# Patient Record
Sex: FEMALE | Race: WHITE | NOT HISPANIC OR LATINO | Employment: OTHER | URBAN - METROPOLITAN AREA
[De-identification: names, ages, dates, MRNs, and addresses within clinical notes are randomized per-mention and may not be internally consistent; named-entity substitution may affect disease eponyms.]

---

## 2023-12-12 ENCOUNTER — OFFICE VISIT (OUTPATIENT)
Dept: HEMATOLOGY/ONCOLOGY | Facility: HOSPITAL | Age: 78
End: 2023-12-12
Payer: MEDICARE

## 2023-12-12 VITALS
BODY MASS INDEX: 32.16 KG/M2 | OXYGEN SATURATION: 100 % | RESPIRATION RATE: 18 BRPM | SYSTOLIC BLOOD PRESSURE: 122 MMHG | DIASTOLIC BLOOD PRESSURE: 70 MMHG | WEIGHT: 153.22 LBS | TEMPERATURE: 97.7 F | HEART RATE: 79 BPM | HEIGHT: 58 IN

## 2023-12-12 DIAGNOSIS — C50.011 MALIGNANT NEOPLASM OF AREOLA OF RIGHT BREAST IN FEMALE, ESTROGEN RECEPTOR POSITIVE (MULTI): Primary | ICD-10-CM

## 2023-12-12 DIAGNOSIS — Z17.0 MALIGNANT NEOPLASM OF AREOLA OF RIGHT BREAST IN FEMALE, ESTROGEN RECEPTOR POSITIVE (MULTI): Primary | ICD-10-CM

## 2023-12-12 PROCEDURE — 99215 OFFICE O/P EST HI 40 MIN: CPT | Performed by: NURSE PRACTITIONER

## 2023-12-12 PROCEDURE — 1126F AMNT PAIN NOTED NONE PRSNT: CPT | Performed by: NURSE PRACTITIONER

## 2023-12-12 PROCEDURE — 1159F MED LIST DOCD IN RCRD: CPT | Performed by: NURSE PRACTITIONER

## 2023-12-12 RX ORDER — FLUTICASONE PROPIONATE AND SALMETEROL XINAFOATE 45; 21 UG/1; UG/1
2 AEROSOL, METERED RESPIRATORY (INHALATION)
COMMUNITY

## 2023-12-12 RX ORDER — LANOLIN ALCOHOL/MO/W.PET/CERES
100 CREAM (GRAM) TOPICAL DAILY
COMMUNITY

## 2023-12-12 RX ORDER — ASCORBIC ACID 250 MG
250 TABLET ORAL DAILY
COMMUNITY

## 2023-12-12 RX ORDER — IBUPROFEN 200 MG
TABLET ORAL AS NEEDED
COMMUNITY

## 2023-12-12 RX ORDER — LEVOTHYROXINE SODIUM 88 UG/1
88 TABLET ORAL
COMMUNITY

## 2023-12-12 RX ORDER — VIT C/E/ZN/COPPR/LUTEIN/ZEAXAN 250MG-90MG
25 CAPSULE ORAL DAILY
COMMUNITY

## 2023-12-12 RX ORDER — ASPIRIN 81 MG/1
81 TABLET ORAL DAILY
COMMUNITY

## 2023-12-12 RX ORDER — ATORVASTATIN CALCIUM 20 MG/1
20 TABLET, FILM COATED ORAL DAILY
COMMUNITY

## 2023-12-12 RX ORDER — OXYBUTYNIN CHLORIDE 5 MG/1
5 TABLET ORAL 3 TIMES DAILY
COMMUNITY

## 2023-12-12 ASSESSMENT — PAIN SCALES - GENERAL: PAINLEVEL: 0-NO PAIN

## 2023-12-12 NOTE — PROGRESS NOTES
Oncology Follow-Up    Denise Waldrop  [id number]        Cancer Staging   No matching staging information was found for the patient.  Oncology History    No history exists.   Cancer History:   Treatment Synopsis:    The patient first noted a lump in her right breast in early December 2016. Imaging revealed an abnormality in the right breast with mass measuring 1.8cm by 1.5cm.  Right axillary imaging was negative for suspicious lymphadenopathy. She had a core biopsy done at [facility] on 12/19/16 which showed IDC, grade 2, ER 90%, LA 90% and HER2 negative (IHC 1+).    1. 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   2. Severe bilateral knee arthritis.   3. Started on neoadjuvant Tamoxifen on 1/20/17. Mammogram dated 12/17/17 showed a decrease in size to 1.1x1.0 at 9:00 2cm from nipple.  4. Developed bilateral DVT and non-occlusive PE so started on Xarelto and Tamoxifen stopped and started on anastrozole 1/2018 and 5 year course completed January 2023.  5. Status post cryogenic ablation of right breast tumor.   6. BCI July 2022 4.6% risk of distant recurrence overall, 2.6% late risk of distance recurrence, she will not benefit with extended therapy.       Subjective    Denise presents for her Routine follow up visit. She had an ischemic stroke this past June and has fully recovered. She is having urinary incontinence and is on oxybutinin. She continues to mourn the passing of her daughter last year. She continues on Fosamax. Denise denies any unusual headaches, balance issues, depression, cough, shortness of breath, problems swallowing, changes in chest/breast area, abdominal pain, bone or muscle pain, vaginal bleeding, rectal bleeding, blood in the urine, vaginal dryness, swelling arms or legs, new or unusual skin moles or lesions.         Objective      Vitals:    12/12/23 1405   BP: 122/70   Pulse: 79   Resp: 18   Temp: 36.5 °C (97.7 °F)   SpO2: 100%         Constitutional: Well developed, alert/oriented x3, no distress, cooperative   Eyes: clear sclera   ENMT: mucous membranes moist, no apparent lesions   Head/Neck: Neck supple, no bruits   Respiratory/Thorax: Patent airways, normal breath sounds with good chest expansion   Cardiovascular: Regular rate and rhythm, no murmurs, 2+ equal pulses of the extremities,   Gastrointestinal: Nondistended, soft, non-tender, no masses palpable, no organomegaly   Musculoskeletal: ROM intact, no joint swelling, normal strength   Extremities: normal extremities, no edema, cyanosis, contusions or wounds   Neurological: alert and oriented x3,  normal strength   Breast:    Lymphatic: No significant lymphadenopathy   Psychological: Appropriate mood and behavior   Skin: Warm and dry, no lesions, no rashes      Physical Exam  Chest:          Comments: Right breast + for cryoablation with circular scar lateral to nipple/areolar complex; no masses, nodules, skin changes, discharge. Left breast without masses, nodules, skin changes, discharge. Both breast with mostly fat replaced tissue.         Lab Results   Component Value Date    WBC 5.4 07/30/2019    HGB 11.9 (L) 07/30/2019    HCT 39.0 07/30/2019    MCV 89 07/30/2019     07/30/2019       Chemistry    Lab Results   Component Value Date/Time     06/20/2019 0608    K 4.2 06/20/2019 0608     06/20/2019 0608    CO2 27 06/20/2019 0608    BUN 15 06/20/2019 0608    CREATININE 0.79 06/20/2019 0608    Lab Results   Component Value Date/Time    CALCIUM 9.0 06/20/2019 0608              Imaging:    Study Result    Narrative & Impression   Interpreted By:  OSMAR MOSLEY MD  MRN: [id number]  Patient Name: ROBYN GRAHAM     STUDY:  DIGITAL DIAG MAMM BILAT WITH ERMIAS;  5/17/2023 10:46 am     ACCESSION NUMBER(S):  [id number]     ORDERING CLINICIAN:  JOSE ROBERT     INDICATION:  Right breast cancer status post cryoablation.     COMPARISON:  05/16/2022, 05/14/2021, and priors dating  back to 2017     FINDINGS:  2D and tomosynthesis images were reviewed at 1 mm slice thickness.     The breast tissue is heterogeneously dense, which may obscure small  masses.  A biopsy tissue marker, Magseed, and postablation fat  necrosis are again seen in the lateral central right breast at  anterior depth. No suspicious masses or calcifications are identified  in either breast.     IMPRESSION:  No mammographic evidence of malignancy.     BI-RADS CATEGORY:     Category: 2 - Benign.  Recommendation: Annual mammogram       7/14/2022 11:13 7/14/2022 11:28     Study Result    Narrative & Impression   Name: ROBYN GRAHAM    Patient ID: [id number] YOB: 1945 Height: 59.0 in.      Gender:     Female   Exam Date:  07/14/2022 Weight: 166.0 lbs.    Indications: , Height Loss, Hypothyroid, Post menopausal,   History of Fracture (Adult)    Fractures:   Ankle                                                                                   Treatments:  Alendronate sodium, Arimidex, Calcium, levothyroxine,   Vitamin D                      LEFT FEMUR - TOTAL   The bone mineral density : 0.875 g/cm2   T-score : -1.1    % of young normal mean :87%   Z-score : 0.6    % of age matched mean : 110%    % change vs. Previous : -     % change vs. Baseline : baseline    *Indicates significant change based on 95% confidence interval.   LEFT FEMUR - NECK   The bone mineral density : 0.908 g/cm2   T-score : -0.9    % of young normal mean: 87%   Z-score : 0.9    % of age matched mean : 116%    % change vs. Previous : -     % change vs. Baseline : baseline    *Indicates significant change based on 95% confidence interval.   SPINE L1-L4   The bone mineral density is 1.363 g/cm2   T-score : 1.5   % of young normal mean is 116%   Z-score : 3.0    % of age matched mean is 136%    % change vs. Previous : -     % change vs. Baseline : baseline    *Indicates significant change based on 95% confidence interval.    World Health  Organization (WHO) criteria for post-menopausal,    Women:     Normal:       T-score at or above -1 SD          Osteopenia:   T-score between -1 and -2.5 SD     Osteoporosis: T-score at or below -2.5 SD       10-Year Fracture Risk:   Major Osteoporotic Fracture 15.3%                          Hip Fracture                2.3%                           Reported Risk Factors       History of Fracture (Adult)    Interpretation:   According to World Health Organization criteria, classification is   low bone mass (osteopenia).       Assessment/Plan    Denise is a 77 yo woman with a hx of right IDC diagnosed in December 2016. She is s/p neoadjuvant tamoxifen, partial mastectomy, with switch to anastrozole and Xarelto after development of a DVT (bilateral), and cryoablation of the tumor. There is no evidence of recurrent disease on today's exam.   Plan:  Exam is negative.  Stop Fosamax after current refill runs out due to higher risk of fracture with over 5 years of treatment. Her next bone density will be due in January 2025.   Will refer to benign hematology as Denise remains on Xarelto due to bilateral DVT due to tamoxifen use early in her treatment.  Encouraged monthly breast self exams, plant based diet, keep alcohol <3 drinks/week, exercise at least 2.5 hours/week.  We reviewed signs/symptoms of recurrence including new masses, new pigmented lesion, tugging or pulling of the skin, nipple discharge, rash in or around the chest area, or any new finding that doesn't resolve within a 2-3 weeks.  All of Denise' questions/concerns were addressed.  Over 25 minutes of time was spent with this patient with >50% of the time with education, counseling, and coordination of care.   Denise will have her mammogram in May 2024. I will see her back in one year.         Diagnoses and all orders for this visit:  Malignant neoplasm of areola of right breast in female, estrogen receptor positive (CMS/HCC)  -     Referral to Benign  Hematology; Future  -     Clinic Appointment Request Follow Up; CHELSEA BENJAMIN; Future          Chelsea Benjamin, ANTONIO-CNP

## 2023-12-13 NOTE — PATIENT INSTRUCTIONS
1. Exercise 2.5 hours per week; bone strengthening, cardio-vascular, resistance training.  2. Please do self breast exams monthly.  3. Keep alcohol under 3 drinks per week.  4. Sun safety - limit sun exposure from 11a-2p when its at its hottest, apply 15-30 sun block and re-apply every 1-2 hours if perspiring or swimming.  5. Eat a plant based diet, add in oily fishes such as mackerel, tuna, and salmon.  6. Get in at least 1,000 mg of calcium per day through diet or supplement for bone strength. Examples of foods higher in calcium are milk, yogurt, fruited yogurt, oranges, fortified orange juice, almonds, almond milk, broccoli, spinach, bok hiral, mustard greens, puddings, custards, ice cream, fortified cereals, bars, and crackers.   7. I will refer you to benign hematology to see if you can safely come off of your Xarelto.  8. Please call the office if any new mass or rash in or around breast, or any uncontrolled symptoms that last over 2-3 weeks at [phone].  9. Your exam is negative. Please schedule your mammogram after 5/17/24  10. It was nice seeing you today, Denise. I will see you back in one year.  Have a Happy, Healthy, Holiday Season!  Thank you for choosing [facility] for your care.

## 2024-01-25 ENCOUNTER — OFFICE VISIT (OUTPATIENT)
Dept: HEMATOLOGY/ONCOLOGY | Facility: HOSPITAL | Age: 79
End: 2024-01-25
Payer: MEDICARE

## 2024-01-25 ENCOUNTER — LAB (OUTPATIENT)
Dept: LAB | Facility: HOSPITAL | Age: 79
End: 2024-01-25
Payer: MEDICARE

## 2024-01-25 VITALS
BODY MASS INDEX: 29.13 KG/M2 | HEIGHT: 60 IN | SYSTOLIC BLOOD PRESSURE: 108 MMHG | TEMPERATURE: 97.3 F | DIASTOLIC BLOOD PRESSURE: 51 MMHG | WEIGHT: 148.4 LBS | OXYGEN SATURATION: 100 % | RESPIRATION RATE: 18 BRPM | HEART RATE: 68 BPM

## 2024-01-25 DIAGNOSIS — Z86.718 HISTORY OF DEEP VEIN THROMBOSIS (DVT) OF LOWER EXTREMITY: ICD-10-CM

## 2024-01-25 DIAGNOSIS — Z86.711 HISTORY OF PULMONARY EMBOLISM: ICD-10-CM

## 2024-01-25 DIAGNOSIS — Z79.01 CHRONIC ANTICOAGULATION: ICD-10-CM

## 2024-01-25 DIAGNOSIS — Z51.81 ANTICOAGULATION MANAGEMENT ENCOUNTER: Primary | ICD-10-CM

## 2024-01-25 DIAGNOSIS — Z79.01 ANTICOAGULATION MANAGEMENT ENCOUNTER: ICD-10-CM

## 2024-01-25 DIAGNOSIS — C50.011 MALIGNANT NEOPLASM OF AREOLA OF RIGHT BREAST IN FEMALE, ESTROGEN RECEPTOR POSITIVE (MULTI): ICD-10-CM

## 2024-01-25 DIAGNOSIS — Z51.81 ANTICOAGULATION MANAGEMENT ENCOUNTER: ICD-10-CM

## 2024-01-25 DIAGNOSIS — Z17.0 MALIGNANT NEOPLASM OF AREOLA OF RIGHT BREAST IN FEMALE, ESTROGEN RECEPTOR POSITIVE (MULTI): ICD-10-CM

## 2024-01-25 DIAGNOSIS — Z79.01 ANTICOAGULATION MANAGEMENT ENCOUNTER: Primary | ICD-10-CM

## 2024-01-25 LAB
ALBUMIN SERPL BCP-MCNC: 4.3 G/DL (ref 3.4–5)
ALP SERPL-CCNC: 56 U/L (ref 33–136)
ALT SERPL W P-5'-P-CCNC: 11 U/L (ref 7–45)
ANION GAP SERPL CALC-SCNC: 12 MMOL/L (ref 10–20)
AST SERPL W P-5'-P-CCNC: 21 U/L (ref 9–39)
BASOPHILS # BLD AUTO: 0.04 X10*3/UL (ref 0–0.1)
BASOPHILS NFR BLD AUTO: 0.8 %
BILIRUB SERPL-MCNC: 1 MG/DL (ref 0–1.2)
BUN SERPL-MCNC: 17 MG/DL (ref 6–23)
CALCIUM SERPL-MCNC: 10.4 MG/DL (ref 8.6–10.3)
CHLORIDE SERPL-SCNC: 101 MMOL/L (ref 98–107)
CO2 SERPL-SCNC: 31 MMOL/L (ref 21–32)
CREAT SERPL-MCNC: 0.69 MG/DL (ref 0.5–1.05)
EGFRCR SERPLBLD CKD-EPI 2021: 88 ML/MIN/1.73M*2
EOSINOPHIL # BLD AUTO: 0.5 X10*3/UL (ref 0–0.4)
EOSINOPHIL NFR BLD AUTO: 9.8 %
ERYTHROCYTE [DISTWIDTH] IN BLOOD BY AUTOMATED COUNT: 14.6 % (ref 11.5–14.5)
FERRITIN SERPL-MCNC: 38 NG/ML (ref 8–150)
FOLATE SERPL-MCNC: 22.6 NG/ML
GLUCOSE SERPL-MCNC: 101 MG/DL (ref 74–99)
HCT VFR BLD AUTO: 37.1 % (ref 36–46)
HGB BLD-MCNC: 11.6 G/DL (ref 12–16)
IMM GRANULOCYTES # BLD AUTO: 0.06 X10*3/UL (ref 0–0.5)
IMM GRANULOCYTES NFR BLD AUTO: 1.2 % (ref 0–0.9)
IRON SATN MFR SERPL: 17 % (ref 25–45)
IRON SERPL-MCNC: 72 UG/DL (ref 35–150)
LYMPHOCYTES # BLD AUTO: 1.11 X10*3/UL (ref 0.8–3)
LYMPHOCYTES NFR BLD AUTO: 21.7 %
MCH RBC QN AUTO: 31 PG (ref 26–34)
MCHC RBC AUTO-ENTMCNC: 31.3 G/DL (ref 32–36)
MCV RBC AUTO: 99 FL (ref 80–100)
MONOCYTES # BLD AUTO: 0.53 X10*3/UL (ref 0.05–0.8)
MONOCYTES NFR BLD AUTO: 10.4 %
NEUTROPHILS # BLD AUTO: 2.87 X10*3/UL (ref 1.6–5.5)
NEUTROPHILS NFR BLD AUTO: 56.1 %
NRBC BLD-RTO: 0 /100 WBCS (ref 0–0)
PLATELET # BLD AUTO: 202 X10*3/UL (ref 150–450)
POTASSIUM SERPL-SCNC: 3.7 MMOL/L (ref 3.5–5.3)
PROT SERPL-MCNC: 7.5 G/DL (ref 6.4–8.2)
RBC # BLD AUTO: 3.74 X10*6/UL (ref 4–5.2)
SODIUM SERPL-SCNC: 140 MMOL/L (ref 136–145)
TIBC SERPL-MCNC: 421 UG/DL (ref 240–445)
UIBC SERPL-MCNC: 349 UG/DL (ref 110–370)
VIT B12 SERPL-MCNC: 921 PG/ML (ref 211–911)
WBC # BLD AUTO: 5.1 X10*3/UL (ref 4.4–11.3)

## 2024-01-25 PROCEDURE — 1159F MED LIST DOCD IN RCRD: CPT | Performed by: PHYSICIAN ASSISTANT

## 2024-01-25 PROCEDURE — 82607 VITAMIN B-12: CPT

## 2024-01-25 PROCEDURE — 85025 COMPLETE CBC W/AUTO DIFF WBC: CPT

## 2024-01-25 PROCEDURE — 1160F RVW MEDS BY RX/DR IN RCRD: CPT | Performed by: PHYSICIAN ASSISTANT

## 2024-01-25 PROCEDURE — 1125F AMNT PAIN NOTED PAIN PRSNT: CPT | Performed by: PHYSICIAN ASSISTANT

## 2024-01-25 PROCEDURE — 36415 COLL VENOUS BLD VENIPUNCTURE: CPT

## 2024-01-25 PROCEDURE — 82746 ASSAY OF FOLIC ACID SERUM: CPT

## 2024-01-25 PROCEDURE — 1036F TOBACCO NON-USER: CPT | Performed by: PHYSICIAN ASSISTANT

## 2024-01-25 PROCEDURE — 83540 ASSAY OF IRON: CPT

## 2024-01-25 PROCEDURE — 1157F ADVNC CARE PLAN IN RCRD: CPT | Performed by: PHYSICIAN ASSISTANT

## 2024-01-25 PROCEDURE — 99214 OFFICE O/P EST MOD 30 MIN: CPT | Performed by: PHYSICIAN ASSISTANT

## 2024-01-25 PROCEDURE — 84075 ASSAY ALKALINE PHOSPHATASE: CPT

## 2024-01-25 PROCEDURE — 82728 ASSAY OF FERRITIN: CPT

## 2024-01-25 ASSESSMENT — PAIN SCALES - GENERAL: PAINLEVEL: 0-NO PAIN

## 2024-01-25 NOTE — PROGRESS NOTES
Patient ID: Denise Waldrop is a 79 y.o. female.  Referring Physician: ALFONZO Peralta  [address] [place] [address]  Primary Care Provider: Faina Cristina MD  Visit Type: Initial Visit    Location: [facility] - Main  Diagnosis/Reason: Anticoagulation Management 2/2 B/L DVT (while on Tamoxifen for Breast CA in 1/2018)    HPI:  Denise Waldrop is a 79 y.o. female referred for consultation of anticoagulation management 2/2 B/L DVT (while on Tamoxifen for Breast CA in 1/2018)    NOTE:  1/25/24 - Patient is followed by ALFONZO Decker for right IDC breast CA that was originally diagnosed in 2016. She was treated w/ neoadjuvant tamoxifen, partial mastectomy then switched to anastrazole. She developed bilateral DVT AND non-occlusive PE when she was on Tamoxifen therapy 1/2018 (prompting her switch from Tamoxifen to Anastrazole). She completed her 5 year course of Anastrazole therapy 1/2023. Per Keyona Mcneill's visit note from 12/12/23, there is no evidence of recurrent disease during her exam. Her most recent mammogram from 5/17/23 showed no evidence of malignancy    Previous Hematological Background:  Hx of hematological disorders: No - Patient denies prior hematologic history  Hx of GI bleed or other bleeding episode: dENIES  Hx of blood transfusions: No - Patient denies prior blood transfusion  Hx of iron supplementation: Yes - Oral supplementation - Unable to recall - Agent: FeSO4  Hx of B12 supplementation: Yes - Prior oral supplementation - Denies adverse effect and reaction  Hx of folate supplementation: Yes - Prior oral supplementation - Denies adverse effect and reaction    Patient denies weight loss, abnormal bruising and bleeding, hematuria, blood in stool, dark/black stools, epistaxis, oral/gingival bleeding, lymphadenopathy, recurrent infections, recurrent fevers, night sweats, early satiety, abdominal pain, bone pain, chest pain, palpitations, SOB, CHAIREZ, fatigue, dizziness,  lightheadedness, PICA.    PMHx:  Active Ambulatory Problems     Diagnosis Date Noted    History of deep vein thrombosis (DVT) of lower extremity 2024    History of pulmonary embolism 2024    Malignant neoplasm of areola of right breast in female, estrogen receptor positive (CMS/HCC) 2024    Anticoagulation management encounter 2024    Chronic anticoagulation 2024     Resolved Ambulatory Problems     Diagnosis Date Noted    No Resolved Ambulatory Problems     Past Medical History:   Diagnosis Date    Unilateral primary osteoarthritis, unspecified knee       Breast CA, right, IDC  Hx B/L DVT  Hx non-occlusive PE    PSHx:  Past Surgical History:   Procedure Laterality Date    OTHER SURGICAL HISTORY  2019    Leg surgery    OTHER SURGICAL HISTORY  2022    Colonoscopy      Left TKA 2023  Plastic surgery to scalp  Tonsillectomy    FHx:  Patient denies family history of hematologic, autoimmune and malignant disorders  Siblings: Sister w/ unknown type of c ancer  Children: 2 daughters - Oldest daughter , younger daughter w/ MS  Miscarriages: Denies    Social Hx:  Denise Waldrop    has no history on file for tobacco use.  She  has no history on file for alcohol use.  She  has no history on file for drug use.  Social History     Socioeconomic History    Marital status:      Spouse name: Not on file    Number of children: Not on file    Years of education: Not on file    Highest education level: Not on file   Occupational History    Not on file   Tobacco Use    Smoking status: Not on file    Smokeless tobacco: Not on file   Substance and Sexual Activity    Alcohol use: Not on file    Drug use: Not on file    Sexual activity: Not on file   Other Topics Concern    Not on file   Social History Narrative    Not on file     Social Determinants of Health     Financial Resource Strain: Not on file   Food Insecurity: Not on file   Transportation Needs: Not on file   Physical  Activity: Not on file   Stress: Not on file   Social Connections: Not on file   Intimate Partner Violence: Not on file   Housing Stability: Not on file      Living Situation: Lives at home w/ daughter  Occupation: Retired - Formerly at [employer]  Marital Status:   Alcohol Use: Rarely - 2 drinks per year  Smoking: Never smoker  Recreational Drug Use: Denies  Special Diets: Regular Diet    Cancer Screenings:  Upper EGD: Denies  Colonoscopy: 3/6/19   Mammogram: 5/17/23 - Currently followed for hx breast CA  PAP smear: Unable to recall  Lung cancer screenings: Denies    Medications and allergies reviewed in EMR.    ROS:  Review of Systems - Oncology   10 point review of systems negative except as state in HPI.    Vitals & Statistics:  Objective   BSA: There is no height or weight on file to calculate BSA.  There were no vitals taken for this visit.    Physical Exam:  Physical Exam  Vitals and nursing note reviewed.   Constitutional:       Appearance: Normal appearance. She is normal weight.   HENT:      Head: Normocephalic and atraumatic.      Right Ear: External ear normal.      Left Ear: External ear normal.      Nose: Nose normal.      Mouth/Throat:      Mouth: Mucous membranes are moist.      Pharynx: Oropharynx is clear.   Eyes:      Extraocular Movements: Extraocular movements intact.      Conjunctiva/sclera: Conjunctivae normal.      Pupils: Pupils are equal, round, and reactive to light.      Comments: Wearing corrective lenses   Cardiovascular:      Rate and Rhythm: Normal rate and regular rhythm.      Pulses: Normal pulses.      Heart sounds: Normal heart sounds.   Pulmonary:      Effort: Pulmonary effort is normal.      Breath sounds: Normal breath sounds.   Abdominal:      General: Abdomen is flat. Bowel sounds are normal.      Palpations: Abdomen is soft.      Comments: No masses or organomegaly palpable during exam   Musculoskeletal:         General: Normal range of motion.      Cervical back:  "Normal range of motion.   Lymphadenopathy:      Comments: No lymphadenopathy palpable   Skin:     General: Skin is warm and dry.      Capillary Refill: Capillary refill takes less than 2 seconds.   Neurological:      Mental Status: She is alert and oriented to person, place, and time. Mental status is at baseline.   Psychiatric:         Mood and Affect: Mood normal.         Behavior: Behavior normal.         Thought Content: Thought content normal.         Judgment: Judgment normal.           Results:  Lab Results   Component Value Date    WBC 5.4 07/30/2019    NEUTROABS 3.52 07/30/2019    LYMPHSABS 0.87 07/30/2019    MONOSABS 0.52 07/30/2019    EOSABS 0.46 (H) 07/30/2019    BASOSABS 0.03 07/30/2019    RBC 4.38 07/30/2019    MCV 89 07/30/2019    MCHC 30.5 (L) 07/30/2019    HGB 11.9 (L) 07/30/2019    HCT 39.0 07/30/2019     07/30/2019     No results found for: \"RETICCTPCT\"   Lab Results   Component Value Date    CREATININE 0.79 06/20/2019    BUN 15 06/20/2019     06/20/2019    K 4.2 06/20/2019     06/20/2019    CO2 27 06/20/2019      No results found for: \"ALT\", \"AST\", \"GGT\", \"ALKPHOS\", \"BILITOT\"   No results found for: \"TSH\"  No results found for: \"TSH\", \"S0HKMYL\", \"D5NLKRE\", \"THYROIDPAB\"  No results found for: \"IRON\", \"TIBC\", \"FERRITIN\"   No results found for: \"XXHAZBFQ60\"   No results found for: \"FOLATE\"  No results found for: \"ALICE\", \"RF\", \"SEDRATE\"   No results found for: \"CRP\"   No results found for: \"ZUHAIR\"  No results found for: \"LDH\"  No results found for: \"HAPTOGLOBIN\"  No results found for: \"SPEP\"  No results found for: \"IGG\", \"IGM\", \"IGA\"  No results found for: \"HEPATOT\", \"HEPAIGM\", \"HEPBCIGM\", \"HEPBCAB\", \"HEPBSAG\", \"HEPCAB\"  No results found for: \"HIV1X2\"    Assessment:  Denise Waldrop is a 79 y.o. female referred for consultation of anticoagulation management 2/2 B/L DVT (while on Tamoxifen for Breast CA in 1/2018)    I reviewed patient's chart including but not limited to labs, imaging, " surgical/procedure notes, pathology, hospital notes, doctor's notes.    1/25/24 results:  WBC count WNL overall - Isolated eosinophilia at 0.50 - New, acute finding - Indicative of underlying inflammation  Normocytic, hypochromic anemia w/ Hb at 11.6 - RBC count low, Hct WNL - RDW elevated  Platelets WNL  Iron studies: Ferritin low at 38 - Iron, TIBC WNL - %Sat low at 17% - Indicative of iron deficiency  Vitamin B12 Elevated at 921    Plan:  Anticoagulation Management 2/2 B/L DVT (while on Tamoxifen for Breast CA in 1/2018)  Lab results pending - Will review when available and address adverse results as needed  Discussed patient's case w/ colleague Dr. Montiel in Benign Hematology per  Policy. Per our discussion the following is recommended for the patient:  Patient IS eligible to stop anticoagulation as she is considered to be in remission, does not appear to have any recurrence risk factors, but does have a bleeding risk factor due to her being older than 64 y/o. Patient has been advised on the signs and symptoms as well as the risks of discontinuing anticoagulation d/t risk of recurrence. Patient verbalizes understanding and would like to proceed w/ discontinuing the anticoagulant if it is safe to do so. She is also currently on ASA 81mg PO QD.   Okay to stop Xarelto at this time  Does not need to continue to follow with me at this time but am more than happy to see the patient in the future if needed.  Will f/u with patient if today's lab results reveal adverse findings    I had an extensive discussion with the patient regarding the diagnosis and discussed the plan of therapy, including general considerations regarding side effects and outcomes. Pt understood and gave appropriate teach back about the plan of care. All questions were answered to the patient's satisfaction. The patient is instructed to contact us at any time if questions or problems arise. Thank you for the opportunity to participate in the care  of this very pleasant patient.    Total time = 80 minutes. 50% or more of this time was spent in counseling and/or coordination of care including reviewing medical history/radiology/labs, examining patient, formulating outlined plan with team, and discussing plan with patient/family.      Zeb Bailey PA-C

## 2024-01-25 NOTE — LETTER
TO WHOM IT MAY CONCERN:  1/25/24    Hello, my name is Zeb Bailey PA-C. I work in Benign Hematology and had the pleasure of seeing Mrs. Denise Waldrop in clinic today for consultation on anticoagulation management.    After reviewing her medical history, risk factors for recurrence of thrombosis/embolism, risk factors for bleeding and relevant lab work, I have determined that based on current guidelines it is safe for Mrs. Waldrop to discontinue her anticoagulation at this time. She has been recommended to continue w/ ASA 81mg by mouth once daily though however. Per  policy, as I am an ELI, I have reviewed and discussed this decision with a physician in my department as well, Dr. Montiel whom agrees with my findings.    Because she is discontinuing anticoagulation at this time, she does not need to follow up with benign hematology.      Please do not hesitate to call me with any questions or concerns.      Thanks,            Zeb Bailey PA-C   [facility]  Benign/Classical Hematology  [address] [place] [address]  Phone: [phone]  Fax: [phone]

## 2024-01-26 ENCOUNTER — OFFICE VISIT (OUTPATIENT)
Dept: NEUROSURGERY | Facility: CLINIC | Age: 79
End: 2024-01-26
Payer: MEDICARE

## 2024-01-26 VITALS
TEMPERATURE: 96.9 F | WEIGHT: 147 LBS | DIASTOLIC BLOOD PRESSURE: 68 MMHG | SYSTOLIC BLOOD PRESSURE: 118 MMHG | BODY MASS INDEX: 29.64 KG/M2 | HEIGHT: 59 IN | HEART RATE: 71 BPM

## 2024-01-26 DIAGNOSIS — M48.02 DEGENERATIVE CERVICAL SPINAL STENOSIS: Primary | ICD-10-CM

## 2024-01-26 PROCEDURE — 1036F TOBACCO NON-USER: CPT | Performed by: NURSE PRACTITIONER

## 2024-01-26 PROCEDURE — 1159F MED LIST DOCD IN RCRD: CPT | Performed by: NURSE PRACTITIONER

## 2024-01-26 PROCEDURE — 1157F ADVNC CARE PLAN IN RCRD: CPT | Performed by: NURSE PRACTITIONER

## 2024-01-26 PROCEDURE — 99214 OFFICE O/P EST MOD 30 MIN: CPT | Performed by: NURSE PRACTITIONER

## 2024-01-26 PROCEDURE — 1160F RVW MEDS BY RX/DR IN RCRD: CPT | Performed by: NURSE PRACTITIONER

## 2024-01-26 PROCEDURE — 1125F AMNT PAIN NOTED PAIN PRSNT: CPT | Performed by: NURSE PRACTITIONER

## 2024-01-26 RX ORDER — ALENDRONATE SODIUM 70 MG/1
1 TABLET ORAL
COMMUNITY
Start: 2020-02-10

## 2024-01-26 ASSESSMENT — PATIENT HEALTH QUESTIONNAIRE - PHQ9
1. LITTLE INTEREST OR PLEASURE IN DOING THINGS: NOT AT ALL
2. FEELING DOWN, DEPRESSED OR HOPELESS: NOT AT ALL
SUM OF ALL RESPONSES TO PHQ9 QUESTIONS 1 & 2: 0

## 2024-01-26 ASSESSMENT — PAIN SCALES - GENERAL: PAINLEVEL: 1

## 2024-01-26 NOTE — PROGRESS NOTES
"It was a pleasure to see Denise Waldrop on 1/26/2024. She is an 79 y.o. year old, right-handed female who presents to the [facility] for evaluation of neck pain. Patient is referred by No ref. provider found. 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    Denise Waldrop has had symptoms of neck pain to scalp and left sided neck pain since 12/19/2023, without inciting event. . She presented to [facility] ED on 12/22/2023, and described a tight clicking sensation at the base of left neck. She felt that neck locks at times when she turns her head. CT C Spine completed and demonstrated severe central stenosis at C4 - 5 and C5 - 6 with osteophytes and OPLL. She was encouraged to follow up with neurosurgery, but wished to follow up, instead, with her PCP. Progression of symptoms prompted today's visit. Thus far, patient has tried nothing. She denies change in bowel / bladder function, saddle anesthesia, imbalance, falls, difficulty dressing, difficulty holding / opening objects.      CT C SPINE on 12/22/2023:  IMPRESSION:  Cervical spine CT: No acute fracture or traumatic malalignment.   Severe central spinal canal stenosis at C4-5 through C5-6 levels   secondary to endplate osteophytes and OPLL.   Dictated by : ANNABEL MALIN MD     PREVIOUS TREATMENTS  NSAIDs  Topical    Previous Spine Surgery: No     Smoker: No   Anticoagulation / Antiplatelets: YES: Daily ASA     ROS: 12 / 12 systems reviewed and are negative unless noted in HPI    /68 (BP Location: Left arm, Patient Position: Sitting, BP Cuff Size: Adult)   Pulse 71   Temp 36.1 °C (96.9 °F) (Temporal)   Ht 1.499 m (4' 11\")   Wt 66.7 kg (147 lb)   BMI 29.69 kg/m²     ON EXAM:  General: Well developed, awake/alert/oriented x 3, no distress, alert and cooperative  Skin: Warm and dry, no visible lesions / rashes  ENMT: Mucous membranes moist, no apparent " injury  Head/Neck: Head with left lateral flexion atable for many yearsNo apparent injury.SIGNIFICANT LIMITATION WITH EXTENSION, LATERAL FLEXION / ROTATION  Respiratory/Thorax: Normal breathing with good chest expansion, thorax symmetric  Cardiovascular: No pitting edema, no JVD. LYMPHEDEMA in BLE  Gastrointestinal: Non-distended  NEUROLOGICAL EXAM:  EOMI, face symmetric  Motor Strength: 5/5 in BUE  Muscle Tone: Normal without spasticity or contractures in all extremities  Muscle Bulk: Normal and symmetric in all extremities  Posture:  -- Cervical: Normal  -- Thoracic: Normal  -- Lumbar : Normal  Sensation: SILT in BUE  Gait: Normal  Deep Tendon Reflexes: 2+ BUE, BLE    Sim's sign absent  Lhermitte Sign/Spurling Sign: Absent (with limited ROM as above)  Finger escape sign: Absent   and release test: Negative      Denise KAMARA Benjie has cervical spinal stenosis. CT C Spine images reviewed with pointing out of significant degenerative changes. We discussed rationale for Physical Therapy for Home Exercise Program. She agrees with this. She does not wish to have MRI C Spine for further evaluation. She wishes to follow up PRN.

## 2024-01-26 NOTE — Clinical Note
She wanted you to have a copy of her note. She does not want surgery, but she wanted to see you, but could not get in without an MRI (she declines MRI).

## 2024-02-09 ENCOUNTER — TELEPHONE (OUTPATIENT)
Dept: ADMISSION | Facility: HOSPITAL | Age: 79
End: 2024-02-09
Payer: MEDICARE

## 2024-02-09 NOTE — TELEPHONE ENCOUNTER
Patient asking if her AVS from 1/25 visit can be mailed to her home address on file.  No further questions or concerns at this time.

## 2024-05-20 ENCOUNTER — APPOINTMENT (OUTPATIENT)
Dept: RADIOLOGY | Facility: HOSPITAL | Age: 79
End: 2024-05-20
Payer: MEDICARE

## 2024-05-22 ENCOUNTER — HOSPITAL ENCOUNTER (OUTPATIENT)
Dept: RADIOLOGY | Facility: HOSPITAL | Age: 79
Discharge: HOME | End: 2024-05-22
Payer: MEDICARE

## 2024-05-22 VITALS — BODY MASS INDEX: 27.82 KG/M2 | WEIGHT: 138 LBS | HEIGHT: 59 IN

## 2024-05-22 DIAGNOSIS — Z17.0 MALIGNANT NEOPLASM OF AREOLA OF RIGHT BREAST IN FEMALE, ESTROGEN RECEPTOR POSITIVE (MULTI): ICD-10-CM

## 2024-05-22 DIAGNOSIS — C50.011 MALIGNANT NEOPLASM OF AREOLA OF RIGHT BREAST IN FEMALE, ESTROGEN RECEPTOR POSITIVE (MULTI): ICD-10-CM

## 2024-05-22 PROCEDURE — 77067 SCR MAMMO BI INCL CAD: CPT | Performed by: RADIOLOGY

## 2024-05-22 PROCEDURE — 77067 SCR MAMMO BI INCL CAD: CPT

## 2024-05-22 PROCEDURE — 77063 BREAST TOMOSYNTHESIS BI: CPT | Performed by: RADIOLOGY

## 2024-05-23 ENCOUNTER — APPOINTMENT (OUTPATIENT)
Dept: SURGICAL ONCOLOGY | Facility: HOSPITAL | Age: 79
End: 2024-05-23
Payer: MEDICARE

## 2024-05-23 ENCOUNTER — APPOINTMENT (OUTPATIENT)
Dept: RADIOLOGY | Facility: HOSPITAL | Age: 79
End: 2024-05-23
Payer: MEDICARE

## 2024-12-17 ENCOUNTER — OFFICE VISIT (OUTPATIENT)
Dept: HEMATOLOGY/ONCOLOGY | Facility: HOSPITAL | Age: 79
End: 2024-12-17
Payer: MEDICARE

## 2024-12-17 VITALS
WEIGHT: 147 LBS | SYSTOLIC BLOOD PRESSURE: 109 MMHG | DIASTOLIC BLOOD PRESSURE: 65 MMHG | HEART RATE: 87 BPM | BODY MASS INDEX: 29.69 KG/M2

## 2024-12-17 DIAGNOSIS — Z17.0 MALIGNANT NEOPLASM OF AREOLA OF RIGHT BREAST IN FEMALE, ESTROGEN RECEPTOR POSITIVE: ICD-10-CM

## 2024-12-17 DIAGNOSIS — Z98.890 HISTORY OF LUMPECTOMY OF RIGHT BREAST: ICD-10-CM

## 2024-12-17 DIAGNOSIS — C50.011 MALIGNANT NEOPLASM OF AREOLA OF RIGHT BREAST IN FEMALE, ESTROGEN RECEPTOR POSITIVE: ICD-10-CM

## 2024-12-17 DIAGNOSIS — Z12.31 BREAST CANCER SCREENING BY MAMMOGRAM: ICD-10-CM

## 2024-12-17 DIAGNOSIS — Z92.21 HISTORY OF AROMATASE INHIBITOR THERAPY: ICD-10-CM

## 2024-12-17 DIAGNOSIS — Z85.3 HISTORY OF RIGHT BREAST CANCER: Primary | ICD-10-CM

## 2024-12-17 PROCEDURE — 99215 OFFICE O/P EST HI 40 MIN: CPT | Performed by: NURSE PRACTITIONER

## 2024-12-17 PROCEDURE — 1157F ADVNC CARE PLAN IN RCRD: CPT | Performed by: NURSE PRACTITIONER

## 2024-12-17 PROCEDURE — 1126F AMNT PAIN NOTED NONE PRSNT: CPT | Performed by: NURSE PRACTITIONER

## 2024-12-17 RX ORDER — FLUTICASONE PROPIONATE 50 MCG
SPRAY, SUSPENSION (ML) NASAL
COMMUNITY
Start: 2024-11-27

## 2024-12-17 ASSESSMENT — PAIN SCALES - GENERAL: PAINLEVEL_OUTOF10: 0-NO PAIN

## 2024-12-17 NOTE — PROGRESS NOTES
Oncology Follow-Up    Denise Waldrop  [id number]        Cancer Staging   No matching staging information was found for the patient.    Cancer History:   Treatment Synopsis:    The patient first noted a lump in her right breast in early December 2016. Imaging revealed an abnormality in the right breast with mass measuring 1.8cm by 1.5cm.  Right axillary imaging was negative for suspicious lymphadenopathy. She had a core biopsy done at [facility] on 12/19/16 which showed IDC, grade 2, ER 90%, DC 90% and HER2 negative (IHC 1+).    1. 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   2. Severe bilateral knee arthritis.   3. Started on neoadjuvant Tamoxifen on 1/20/17. Mammogram dated 12/17/17 showed a decrease in size to 1.1x1.0 at 9:00 2cm from nipple.  4. Developed bilateral DVT and non-occlusive PE so started on Xarelto and Tamoxifen stopped and started on anastrozole 1/2018 and 5 year course completed January 2023.  5. Status post cryogenic ablation of right breast tumor.   6. BCI July 2022 4.6% risk of distant recurrence overall, 2.6% late risk of distance recurrence, she will not benefit with extended therapy.        Subjective    Denise presents for her Oncology Follow Up Visit. She had bilateral cataract surgery. She fell as she felt her head was not clear. The testing was negative. She is doing monthly Breast self exams. She is having a venus duplex scan for swelling tomorrow. Denise is going to the wound clinic for a slow healing wound on her left leg. She reports improvement. She rates her energy level as 6-7/10 and increased distress due to financial issues. Her granddaughters are now living with her and her . Denise denies any unusual headaches, balance issues, depression, cough, shortness of breath, problems swallowing, changes in chest/breast area, abdominal pain, bone or muscle pain, vaginal bleeding, rectal bleeding, blood in the urine, vaginal  dryness, swelling arms or legs, new or unusual skin moles or lesions.     Objective      Vitals:    12/17/24 1400   BP: 109/65   Pulse: 87        Constitutional: Well developed, alert/oriented x3, no distress, cooperative   Eyes: clear sclera   ENMT: mucous membranes moist, no apparent lesions   Head/Neck: Neck supple, no bruits   Respiratory/Thorax: Patent airways, normal breath sounds with good chest expansion   Cardiovascular: Regular rate and rhythm, no murmurs, 2+ equal pulses of the extremities,   Gastrointestinal: Nondistended, soft, non-tender, no masses palpable, no organomegaly   Musculoskeletal: ROM intact, no joint swelling, normal strength   Extremities: normal extremities, no edema, cyanosis, contusions or wounds   Neurological: alert and oriented x3,  normal strength   Breast:     Lymphatic: No significant lymphadenopathy   Psychological: Appropriate mood and behavior   Skin: Warm and dry, no lesions, no rashes      Physical Exam  Chest:          Comments: Right breast with mass from cryo ablation surgery; no masses, nodules, skin changes, discharge. Left breast + for breast conserving surgery with well healed incision; no masses, nodules, skin changes, discharge.           Lab Results   Component Value Date    WBC 5.1 01/25/2024    HGB 11.6 (L) 01/25/2024    HCT 37.1 01/25/2024    MCV 99 01/25/2024     01/25/2024       Chemistry    Lab Results   Component Value Date/Time     01/25/2024 1431    K 3.7 01/25/2024 1431     01/25/2024 1431    CO2 31 01/25/2024 1431    BUN 17 01/25/2024 1431    CREATININE 0.69 01/25/2024 1431    Lab Results   Component Value Date/Time    CALCIUM 10.4 (H) 01/25/2024 1431    ALKPHOS 56 01/25/2024 1431    AST 21 01/25/2024 1431    ALT 11 01/25/2024 1431    BILITOT 1.0 01/25/2024 1431         Imaging:    Status Exam Begun Exam Ended   Final 5/22/2024 11:20 5/22/2024 11:53     Study Result    Narrative & Impression   Interpreted By:  Kiara Cristina,    STUDY:  BI MAMMO BILATERAL SCREENING TOMOSYNTHESIS;  5/22/2024 11:53 am      ACCESSION NUMBER(S):  [id number]      ORDERING CLINICIAN:  CHELSEA BENJAMIN      INDICATION:  Screening. History of right breast cancer and is status post  cryoablation. Intentional 20 pounds weight loss.      COMPARISON:  05/17/2023, 05/16/2022, 05/28/2020      FINDINGS:  2D and tomosynthesis images were reviewed at 1 mm slice thickness.      Density:  The breast tissue is heterogeneously dense, which may  obscure small masses.      Fat necrosis and a Magseed are again seen in the central lateral  right breast at anterior to middle depth. No suspicious masses or  calcifications are identified.      IMPRESSION:  No mammographic evidence of malignancy.      BI-RADS CATEGORY:      BI-RADS Category:  2 Benign.  Recommendation:  Annual Screening.  Recommended Date:  1 Year.  Laterality:  Bilateral.   Exam Information    Status Exam Begun Exam Ended   Final 7/14/2022 11:13 7/14/2022 11:28     Study Result    Narrative & Impression   Name: ROBYN GRAHAM    Patient ID: [id number] YOB: 1945 Height: 59.0 in.      Gender:     Female   Exam Date:  07/14/2022 Weight: 166.0 lbs.    Indications: , Height Loss, Hypothyroid, Post menopausal,   History of Fracture (Adult)    Fractures:   Ankle                                                                                   Treatments:  Alendronate sodium, Arimidex, Calcium, levothyroxine,   Vitamin D                      LEFT FEMUR - TOTAL   The bone mineral density : 0.875 g/cm2   T-score : -1.1    % of young normal mean :87%   Z-score : 0.6    % of age matched mean : 110%    % change vs. Previous : -     % change vs. Baseline : baseline    *Indicates significant change based on 95% confidence interval.   LEFT FEMUR - NECK   The bone mineral density : 0.908 g/cm2   T-score : -0.9    % of young normal mean: 87%   Z-score : 0.9    % of age matched mean : 116%    % change vs. Previous :  -     % change vs. Baseline : baseline    *Indicates significant change based on 95% confidence interval.   SPINE L1-L4   The bone mineral density is 1.363 g/cm2   T-score : 1.5   % of young normal mean is 116%   Z-score : 3.0    % of age matched mean is 136%    % change vs. Previous : -     % change vs. Baseline : baseline    *Indicates significant change based on 95% confidence interval.    World Health Organization (WHO) criteria for post-menopausal,    Women:     Normal:       T-score at or above -1 SD          Osteopenia:   T-score between -1 and -2.5 SD     Osteoporosis: T-score at or below -2.5 SD       10-Year Fracture Risk:   Major Osteoporotic Fracture 15.3%                          Hip Fracture                2.3%                           Reported Risk Factors       History of Fracture (Adult)    Interpretation:   According to World Health Organization criteria, classification is   low bone mass (osteopenia).     Assessment/Plan    Denise is an 78 yo woman with a remote hx of right IDC G-2 diagnosed December 2016. She is s/p neoadjuvant tamoxifen, partial mastectomy, XRT, and completed a total of 5 years of combination endocrine therapy with tamoxifen and anastrozole. Breast Cancer Index testing showed a low risk of distant recurrence (2.6%) and no benefit with extended therapy. There is no evidence of recurrent disease on today's exam.     Plan:  Exam is negative.  Mammogram is due in May 2025.   Continue with wound clinic.  See Dr. Cristina at least yearly.  Encouraged monthly breast self exams, plant based diet, keep alcohol <3 drinks/week, exercise at least 2.5 hours/week.  We reviewed signs/symptoms of recurrence including new masses, new pigmented lesion, tugging or pulling of the skin, nipple discharge, rash in or around the chest area, or any new finding that doesn't resolve within a 2-3 weeks.  All of Denise' questions/concerns were addressed.  Over 25 minutes of time was spent with  this patient with >50% of the time with education, counseling, and coordination of care.   I will see Denise back in one year. She will call with any concerns.      Diagnoses and all orders for this visit:  History of right breast cancer  History of aromatase inhibitor therapy  History of lumpectomy of right breast  Malignant neoplasm of areola of right breast in female, estrogen receptor positive  -     Clinic Appointment Request Follow Up; CHELSEA BENJAMIN, ANTONIO-CNP

## 2024-12-18 NOTE — PATIENT INSTRUCTIONS
1. Exercise 2.5 hours per week; bone strengthening, cardio-vascular, resistance training.  2. Please do self breast exams monthly.  3. Keep alcohol under 3 drinks per week.  4. Sun safety - limit sun exposure from 11a-2p when its at its hottest, apply 15-30 sun block and re-apply every 1-2 hours if perspiring or swimming.  5. Eat a plant based diet, add in oily fishes such as mackerel, tuna, and salmon.  6. Get in at least 1,000 mg of calcium per day through diet or supplement for bone strength. Examples of foods higher in calcium are milk, yogurt, fruited yogurt, oranges, fortified orange juice, almonds, almond milk, broccoli, spinach, bok hiral, mustard greens, puddings, custards, ice cream, fortified cereals, bars, and crackers.   7. See Dr. Cristina at least yearly in primary care.  8. Please call the office if any new mass or rash in or around breast, or any uncontrolled symptoms that last over 2-3 weeks at [phone].  9. Your exam is negative today.  10. It was nice seeing you at your appointment, Denise. Your mammogram is due in late May. Our schedulers will call you with an appointment.  I will see you back in one year. At that time we will discuss transitioning your visits to Dr. Cristina.  Thank you for choosing [facility] for your care.  Have a Happy, Healthy, Holiday Season!

## 2025-05-23 ENCOUNTER — HOSPITAL ENCOUNTER (OUTPATIENT)
Dept: RADIOLOGY | Facility: HOSPITAL | Age: 80
Discharge: HOME | End: 2025-05-23
Payer: MEDICARE

## 2025-05-23 DIAGNOSIS — Z12.31 BREAST CANCER SCREENING BY MAMMOGRAM: ICD-10-CM

## 2025-05-23 DIAGNOSIS — Z85.3 HISTORY OF RIGHT BREAST CANCER: ICD-10-CM

## 2025-05-23 PROCEDURE — 77067 SCR MAMMO BI INCL CAD: CPT

## 2025-05-28 ENCOUNTER — TELEPHONE (OUTPATIENT)
Dept: HEMATOLOGY/ONCOLOGY | Facility: CLINIC | Age: 80
End: 2025-05-28
Payer: MEDICARE

## 2025-05-28 NOTE — TELEPHONE ENCOUNTER
Nurse called patient to reviewed benign mammogram results. Call went to an identifiable . Nurse left message stating name, provider, reason for call and call back number.    ROSINA GALLOWAY RN

## 2025-05-29 ENCOUNTER — CLINICAL SUPPORT (OUTPATIENT)
Dept: AUDIOLOGY | Facility: CLINIC | Age: 80
End: 2025-05-29
Payer: MEDICARE

## 2025-05-29 ENCOUNTER — OFFICE VISIT (OUTPATIENT)
Dept: OTOLARYNGOLOGY | Facility: CLINIC | Age: 80
End: 2025-05-29
Payer: MEDICARE

## 2025-05-29 VITALS
HEIGHT: 58 IN | BODY MASS INDEX: 30.44 KG/M2 | WEIGHT: 145 LBS | HEART RATE: 65 BPM | DIASTOLIC BLOOD PRESSURE: 69 MMHG | SYSTOLIC BLOOD PRESSURE: 120 MMHG | TEMPERATURE: 98.2 F

## 2025-05-29 DIAGNOSIS — H61.23 EXCESSIVE CERUMEN IN BOTH EAR CANALS: ICD-10-CM

## 2025-05-29 DIAGNOSIS — H91.93 BILATERAL HEARING LOSS, UNSPECIFIED HEARING LOSS TYPE: ICD-10-CM

## 2025-05-29 DIAGNOSIS — H90.3 SENSORINEURAL HEARING LOSS (SNHL), BILATERAL: Primary | ICD-10-CM

## 2025-05-29 DIAGNOSIS — H61.22 EXCESSIVE CERUMEN IN LEFT EAR CANAL: Primary | ICD-10-CM

## 2025-05-29 PROCEDURE — 1126F AMNT PAIN NOTED NONE PRSNT: CPT | Performed by: NURSE PRACTITIONER

## 2025-05-29 PROCEDURE — 1159F MED LIST DOCD IN RCRD: CPT | Performed by: NURSE PRACTITIONER

## 2025-05-29 PROCEDURE — 1036F TOBACCO NON-USER: CPT | Performed by: NURSE PRACTITIONER

## 2025-05-29 PROCEDURE — 92557 COMPREHENSIVE HEARING TEST: CPT | Performed by: AUDIOLOGIST

## 2025-05-29 PROCEDURE — 1157F ADVNC CARE PLAN IN RCRD: CPT | Performed by: NURSE PRACTITIONER

## 2025-05-29 PROCEDURE — 99203 OFFICE O/P NEW LOW 30 MIN: CPT | Performed by: NURSE PRACTITIONER

## 2025-05-29 PROCEDURE — 99213 OFFICE O/P EST LOW 20 MIN: CPT | Performed by: NURSE PRACTITIONER

## 2025-05-29 PROCEDURE — 92550 TYMPANOMETRY & REFLEX THRESH: CPT | Mod: 52 | Performed by: AUDIOLOGIST

## 2025-05-29 RX ORDER — ROSUVASTATIN CALCIUM 10 MG/1
10 TABLET, COATED ORAL
COMMUNITY

## 2025-05-29 RX ORDER — MELOXICAM 7.5 MG/1
7.5 TABLET ORAL DAILY
COMMUNITY
Start: 2025-04-21

## 2025-05-29 RX ORDER — ALBUTEROL SULFATE 90 UG/1
INHALANT RESPIRATORY (INHALATION)
COMMUNITY
Start: 2025-05-02

## 2025-05-29 RX ORDER — FLUTICASONE PROPIONATE AND SALMETEROL 100; 50 UG/1; UG/1
POWDER RESPIRATORY (INHALATION)
COMMUNITY
Start: 2025-05-02

## 2025-05-29 RX ORDER — NEOMYCIN SULFATE, POLYMYXIN B SULFATE AND HYDROCORTISONE 10; 3.5; 1 MG/ML; MG/ML; [USP'U]/ML
SUSPENSION/ DROPS AURICULAR (OTIC)
COMMUNITY
Start: 2025-04-16

## 2025-05-29 SDOH — ECONOMIC STABILITY: FOOD INSECURITY: WITHIN THE PAST 12 MONTHS, THE FOOD YOU BOUGHT JUST DIDN'T LAST AND YOU DIDN'T HAVE MONEY TO GET MORE.: NEVER TRUE

## 2025-05-29 SDOH — ECONOMIC STABILITY: FOOD INSECURITY: WITHIN THE PAST 12 MONTHS, YOU WORRIED THAT YOUR FOOD WOULD RUN OUT BEFORE YOU GOT MONEY TO BUY MORE.: NEVER TRUE

## 2025-05-29 ASSESSMENT — LIFESTYLE VARIABLES
HOW MANY STANDARD DRINKS CONTAINING ALCOHOL DO YOU HAVE ON A TYPICAL DAY: 1 OR 2
HOW OFTEN DO YOU HAVE SIX OR MORE DRINKS ON ONE OCCASION: NEVER
HOW OFTEN DO YOU HAVE A DRINK CONTAINING ALCOHOL: MONTHLY OR LESS
AUDIT-C TOTAL SCORE: 1
SKIP TO QUESTIONS 9-10: 1

## 2025-05-29 ASSESSMENT — PATIENT HEALTH QUESTIONNAIRE - PHQ9
SUM OF ALL RESPONSES TO PHQ9 QUESTIONS 1 AND 2: 0
1. LITTLE INTEREST OR PLEASURE IN DOING THINGS: NOT AT ALL
2. FEELING DOWN, DEPRESSED OR HOPELESS: NOT AT ALL

## 2025-05-29 ASSESSMENT — ENCOUNTER SYMPTOMS
OCCASIONAL FEELINGS OF UNSTEADINESS: 0
LOSS OF SENSATION IN FEET: 0
DEPRESSION: 0

## 2025-05-29 ASSESSMENT — COLUMBIA-SUICIDE SEVERITY RATING SCALE - C-SSRS
6. HAVE YOU EVER DONE ANYTHING, STARTED TO DO ANYTHING, OR PREPARED TO DO ANYTHING TO END YOUR LIFE?: NO
1. IN THE PAST MONTH, HAVE YOU WISHED YOU WERE DEAD OR WISHED YOU COULD GO TO SLEEP AND NOT WAKE UP?: NO
2. HAVE YOU ACTUALLY HAD ANY THOUGHTS OF KILLING YOURSELF?: NO

## 2025-05-29 ASSESSMENT — PAIN SCALES - GENERAL: PAINLEVEL_OUTOF10: 0-NO PAIN

## 2025-05-29 NOTE — PROGRESS NOTES
"AUDIOLOGY ADULT AUDIOMETRIC EVALUATION      Name:  Denise Waldrop  :  1945  Age:  80 y.o.  Date of Evaluation:  2025    HISTORY  Reason for visit:  cerumen  Ms. Waldrop is seen 2025 at the request of ANTONIO Castaneda, CORKY-NIGEL for an evaluation of hearing.      Chief complaint:    - cerumen was cleaned today; has not noticed any change in hearing after cerumen management  - hearing may have gradually worsened since previous audiogram of     Hearing loss:  hearing loss, possibly left worse than right; since around her late 70s  Tinnitus:   denies  Otitis Media: denies  Otologic surgical history:  denies  Dizziness/imbalance:  denies  Otalgia:  brief ear pain earlier today  Ear pressure/fullness:  denies  History of excessive noise exposure:  yes  Other: denies     Hearing aid history: current pair obtained in 2024; previous pair around          EVALUATION  Please find audiogram in \"Media\" tab (Document Type:  Audiology Report) or included at the bottom of this note.    RESULTS   Otoscopic Evaluation: ***     Immittance Measures (226 Hz probe tone):   ***    ***    Test technique:  standard behavioral technique via ***.  Reliability is ***.    Pure Tone Audiometry:  ***    Speech Audiometry:        Right Ear:  Speech Reception Threshold (SRT) was obtained at *** dBHL                 Speech discrimination score was ***% in quiet when words were presented at *** dBHL      Left Ear:  Speech Reception Threshold (SRT) was obtained at *** dBHL                 Speech discrimination score was ***% in quiet when words were presented at *** dBHL    IMPRESSIONS:  ***    RECOMMENDATIONS  ***    PATIENT EDUCATION  Discussed results and recommendations with ***.  Questions were addressed and the patient was encouraged to contact our department should concerns arise.       PA Vazquez, [facility]-A  Licensed Audiologist    ***   " audiogram of 2/21/2022, there has been worsening of right hearing at 8000 Hz.      Patient is expected to experience communication difficulty in many situations.    Patient is expected to continue to benefit from devices that provide amplification (e.g., hearing aids) and improve the desired sound signal over that of background noise as well as from effective communication strategies.      RECOMMENDATIONS  Continue with medical follow-up with ANTONIO Castaneda FNP-C.  Continue with hearing aid use.   Return to fitter of hearing aids for service as needed.  Reassess hearing in 1 year (or sooner if medically indicated or if there is a concern for a change in hearing).    Continue with medical follow-up as indicated.      PATIENT EDUCATION  Discussed results and recommendations with patient.  Questions were addressed and the patient was encouraged to contact our department should concerns arise.       PA Vazquez, CCC-A  Licensed Audiologist

## 2025-05-29 NOTE — PROGRESS NOTES
Subjective   Patient ID: Denise Waldrop is a 80 y.o. female who presents for No chief complaint on file..    HPI    Patient here for ear cleaning. Last cleaning was 2/21/2022.  The ears were feeling clogged. PCP attempted to clean the ears so not sure if there is wax. Gets occasional left ear pain.    I reviewed patient's past medical and surgical history.  Problem List[1]  Surgical History[2]    Review of Systems    All other systems have been reviewed and are negative for complaints except for those mentioned in history of present illness, past medical history and problem list.     Objective   Physical Exam    Right Ear: External inspection of ear with no deformity, scars, or masses. EAC is with excessive cerumen that was removed using the microscope and alligator forceps. .  TM is intact with no sign of infection, effusion, or retraction.  No perforation seen.     Left Ear: External inspection of ear with no deformity, scars, or masses. EAC is clear.  TM is intact with no sign of infection, effusion, or retraction.  No perforation seen.     Patient wears bilateral hearing aids.    Assessment/Plan   Diagnoses and all orders for this visit:  Excessive cerumen in left ear canal  Bilateral hearing loss, unspecified hearing loss type    Ear successfully cleaned. Reassurance given.  Patient obtaining audiogram after our visit today. I will follow up with results if needed.    All questions answered to patient satisfaction.        ANTONIO Medrano-CNP 05/29/25 3:04 PM        [1]   Patient Active Problem List  Diagnosis    History of deep vein thrombosis (DVT) of lower extremity    History of pulmonary embolism    Malignant neoplasm of areola of right breast in female, estrogen receptor positive    Anticoagulation management encounter    Chronic anticoagulation    History of right breast cancer    History of aromatase inhibitor therapy    History of lumpectomy of right breast   [2]   Past Surgical  History:  Procedure Laterality Date    BREAST BIOPSY Right 01/01/2017    cryoablation    OTHER SURGICAL HISTORY  08/19/2019    Leg surgery    OTHER SURGICAL HISTORY  02/21/2022    Colonoscopy

## 2025-08-13 ENCOUNTER — HOSPITAL ENCOUNTER (OUTPATIENT)
Dept: RADIOLOGY | Facility: HOSPITAL | Age: 80
Discharge: HOME | End: 2025-08-13
Payer: MEDICARE

## 2025-08-13 DIAGNOSIS — M81.0 AGE-RELATED OSTEOPOROSIS WITHOUT CURRENT PATHOLOGICAL FRACTURE: ICD-10-CM

## 2025-08-13 PROCEDURE — 77080 DXA BONE DENSITY AXIAL: CPT | Performed by: RADIOLOGY

## 2025-08-13 PROCEDURE — 77080 DXA BONE DENSITY AXIAL: CPT

## 2025-09-07 ENCOUNTER — OFFICE VISIT (OUTPATIENT)
Dept: URGENT CARE | Age: 80
End: 2025-09-07
Payer: MEDICARE

## 2025-09-07 VITALS
BODY MASS INDEX: 28.83 KG/M2 | DIASTOLIC BLOOD PRESSURE: 68 MMHG | HEIGHT: 59 IN | OXYGEN SATURATION: 99 % | WEIGHT: 143 LBS | HEART RATE: 67 BPM | SYSTOLIC BLOOD PRESSURE: 125 MMHG | RESPIRATION RATE: 16 BRPM | TEMPERATURE: 98.2 F

## 2025-09-07 DIAGNOSIS — S20.211A CONTUSION OF RIB ON RIGHT SIDE, INITIAL ENCOUNTER: Primary | ICD-10-CM
